# Patient Record
Sex: MALE | Race: WHITE
[De-identification: names, ages, dates, MRNs, and addresses within clinical notes are randomized per-mention and may not be internally consistent; named-entity substitution may affect disease eponyms.]

---

## 2019-12-24 ENCOUNTER — HOSPITAL ENCOUNTER (INPATIENT)
Dept: HOSPITAL 63 - ER | Age: 44
LOS: 1 days | Discharge: HOME | DRG: 897 | End: 2019-12-25
Attending: INTERNAL MEDICINE | Admitting: INTERNAL MEDICINE
Payer: MEDICARE

## 2019-12-24 VITALS — BODY MASS INDEX: 44.1 KG/M2 | HEIGHT: 71 IN | WEIGHT: 315 LBS

## 2019-12-24 VITALS — DIASTOLIC BLOOD PRESSURE: 82 MMHG | SYSTOLIC BLOOD PRESSURE: 127 MMHG

## 2019-12-24 DIAGNOSIS — K21.9: ICD-10-CM

## 2019-12-24 DIAGNOSIS — F31.9: ICD-10-CM

## 2019-12-24 DIAGNOSIS — Z91.5: ICD-10-CM
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
## 2019-12-24 DIAGNOSIS — R45.851: ICD-10-CM

## 2019-12-24 DIAGNOSIS — G47.33: ICD-10-CM

## 2019-12-24 DIAGNOSIS — F43.10: ICD-10-CM
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
## 2019-12-24 DIAGNOSIS — F41.9: ICD-10-CM
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
## 2019-12-24 DIAGNOSIS — E66.01: ICD-10-CM

## 2019-12-24 DIAGNOSIS — I25.10: ICD-10-CM

## 2019-12-24 DIAGNOSIS — I10: ICD-10-CM
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
## 2019-12-24 DIAGNOSIS — E11.9: ICD-10-CM

## 2019-12-24 DIAGNOSIS — F10.129: Primary | ICD-10-CM
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
## 2019-12-24 LAB
ALBUMIN SERPL-MCNC: 3.5 G/DL (ref 3.4–5)
ALBUMIN/GLOB SERPL: 0.8 {RATIO} (ref 1–1.7)
ALP SERPL-CCNC: 106 U/L (ref 46–116)
ALT SERPL-CCNC: 47 U/L (ref 16–63)
AMPHETAMINE/METHAMPHETAMINE: (no result)
ANION GAP SERPL CALC-SCNC: 7 MMOL/L (ref 6–14)
APTT PPP: (no result) S
AST SERPL-CCNC: 24 U/L (ref 15–37)
BACTERIA #/AREA URNS HPF: 0 /HPF
BARBITURATES UR-MCNC: (no result) UG/ML
BASOPHILS # BLD AUTO: 0 X10^3/UL (ref 0–0.2)
BASOPHILS NFR BLD: 0 % (ref 0–3)
BENZODIAZ UR-MCNC: (no result) UG/L
BILIRUB SERPL-MCNC: 0.3 MG/DL (ref 0.2–1)
BILIRUB UR QL STRIP: (no result)
BUN/CREAT SERPL: 12 (ref 6–20)
CA-I SERPL ISE-MCNC: 7 MG/DL (ref 8–26)
CALCIUM SERPL-MCNC: 8.8 MG/DL (ref 8.5–10.1)
CANNABINOIDS UR-MCNC: (no result) UG/L
CHLORIDE SERPL-SCNC: 105 MMOL/L (ref 98–107)
CO2 SERPL-SCNC: 30 MMOL/L (ref 21–32)
COCAINE UR-MCNC: (no result) NG/ML
CREAT SERPL-MCNC: 0.6 MG/DL (ref 0.7–1.3)
EOSINOPHIL NFR BLD: 0.2 X10^3/UL (ref 0–0.7)
EOSINOPHIL NFR BLD: 2 % (ref 0–3)
ERYTHROCYTE [DISTWIDTH] IN BLOOD BY AUTOMATED COUNT: 18.3 % (ref 11.5–14.5)
FIBRINOGEN PPP-MCNC: CLEAR MG/DL
GFR SERPLBLD BASED ON 1.73 SQ M-ARVRAT: 147.8 ML/MIN
GLOBULIN SER-MCNC: 4.5 G/DL (ref 2.2–3.8)
GLUCOSE SERPL-MCNC: 105 MG/DL (ref 70–99)
GLUCOSE UR STRIP-MCNC: (no result) MG/DL
HCT VFR BLD CALC: 38.1 % (ref 39–53)
HGB BLD-MCNC: 12.1 G/DL (ref 13–17.5)
LIPASE: 119 U/L (ref 73–393)
LYMPHOCYTES # BLD: 1.4 X10^3/UL (ref 1–4.8)
LYMPHOCYTES NFR BLD AUTO: 18 % (ref 24–48)
MCH RBC QN AUTO: 23 PG (ref 25–35)
MCHC RBC AUTO-ENTMCNC: 32 G/DL (ref 31–37)
MCV RBC AUTO: 73 FL (ref 79–100)
METHADONE SERPL-MCNC: (no result) NG/ML
MONO #: 0.3 X10^3/UL (ref 0–1.1)
MONOCYTES NFR BLD: 4 % (ref 0–9)
NEUT #: 6 X10^3UL (ref 1.8–7.7)
NEUTROPHILS NFR BLD AUTO: 76 % (ref 31–73)
NITRITE UR QL STRIP: (no result)
OPIATES UR-MCNC: (no result) NG/ML
PCP SERPL-MCNC: (no result) MG/DL
PLATELET # BLD AUTO: 295 X10^3/UL (ref 140–400)
POTASSIUM SERPL-SCNC: 3.8 MMOL/L (ref 3.5–5.1)
PROT SERPL-MCNC: 8 G/DL (ref 6.4–8.2)
RBC # BLD AUTO: 5.25 X10^6/UL (ref 4.3–5.7)
RBC #/AREA URNS HPF: 0 /HPF (ref 0–2)
SODIUM SERPL-SCNC: 142 MMOL/L (ref 136–145)
SP GR UR STRIP: 1.01
SQUAMOUS #/AREA URNS LPF: (no result) /LPF
UROBILINOGEN UR-MCNC: 0.2 MG/DL
WBC # BLD AUTO: 8 X10^3/UL (ref 4–11)
WBC #/AREA URNS HPF: 0 /HPF (ref 0–4)

## 2019-12-24 PROCEDURE — 96366 THER/PROPH/DIAG IV INF ADDON: CPT

## 2019-12-24 PROCEDURE — 70450 CT HEAD/BRAIN W/O DYE: CPT

## 2019-12-24 PROCEDURE — 36415 COLL VENOUS BLD VENIPUNCTURE: CPT

## 2019-12-24 PROCEDURE — 80053 COMPREHEN METABOLIC PANEL: CPT

## 2019-12-24 PROCEDURE — 83690 ASSAY OF LIPASE: CPT

## 2019-12-24 PROCEDURE — 96365 THER/PROPH/DIAG IV INF INIT: CPT

## 2019-12-24 PROCEDURE — 80307 DRUG TEST PRSMV CHEM ANLYZR: CPT

## 2019-12-24 PROCEDURE — 85025 COMPLETE CBC W/AUTO DIFF WBC: CPT

## 2019-12-24 PROCEDURE — 81001 URINALYSIS AUTO W/SCOPE: CPT

## 2019-12-24 NOTE — PHYS DOC
Past History


Past Medical History:  Anxiety, Depression, Diabetes, Other


Additional Past Medical Histor:  Suicidal ideation with multiple attempts last 

on 6 weeks ago, PTSD, CAD


Past Surgical History:  No Surgical History


Additional Smoking Information:  


Smoked meth 3 weeks ago


Alcohol Use:  Heavy


Additional Alcohol Information:  


1/2 1/5 of vodka today


Drug Use:  Methamphetamine





Adult General


Chief Complaint


Chief Complaint:  ALCOHOL INTOXICATION





HPI


HPI


44-year-old male presents via EMS with intoxication, fall and suicidal ideation.

The patient been drinking alcohol today. He's been having suicidal thoughts. 

Lately. He's been thinking specifically about buying bleach and drinking it. 

Today while he was drinking alcohol, he thinks he might a passed out. He is not 

exactly sure. He knows that he lost consciousness and woke up on the ground with

his head hurting. He tells me that he has PTSD and he said a lot of emotional 

stress. He just wants his life to be over. He knows that he needs to get some 

help as these feelings aren't going away. He denies any other injuries or 

symptoms.





Review of Systems


Review of Systems





Constitutional: Denies fever or chills []


Eyes: Denies change in visual acuity, redness, or eye pain []


HENT: Denies nasal congestion or sore throat. Fall, abrasion right forehead.[]


Respiratory: Denies cough or shortness of breath []


Cardiovascular: No additional information not addressed in HPI []


GI: Denies abdominal pain, nausea, vomiting, bloody stools or diarrhea []


: Denies dysuria or hematuria []


Musculoskeletal: Denies back pain or joint pain []


Integument: Denies rash or skin lesions []


Neurologic: LOC. Denies headache, focal weakness or sensory changes []


Endocrine: Denies polyuria or polydipsia []





All other systems were reviewed and found to be within normal limits, except as 

documented in this note.





Current Medications


Current Medications





Current Medications








 Medications


  (Trade)  Dose


 Ordered  Sig/Pradip  Start Time


 Stop Time Status Last Admin


Dose Admin


 


 Multivitamins/


 Minerals


  (Infuvite Adult)  10 ml  STK-MED ONCE  12/24/19 18:45


 12/24/19 18:45 DC  





 


 Multivitamins/


 Minerals 10 ml/


 Folic Acid 1 mg/


 Thiamine HCl 100


 mg/Sodium Chloride  1,011.1 ml


  @ 1,000 mls/


 hr  1X  ONCE  12/24/19 18:30


 12/24/19 19:30  12/24/19 18:50


1,000 MLS/HR


 


 Thiamine HCl


  (Thiamine Vial)  200 mg  STK-MED ONCE  12/24/19 18:45


 12/24/19 18:45 DC  














Allergies


Allergies





Allergies








Coded Allergies Type Severity Reaction Last Updated Verified


 


  No Known Drug Allergies    12/24/19 No











Physical Exam


Physical Exam





Constitutional: Well developed, morbidly obese, well nourished, no acute dist

ress, non-toxic appearance. []


HENT: Abrasion right forehead. Normocephalic,, bilateral external ears normal, 

oropharynx dry, no oral exudates, nose normal. []


Eyes: PERRLA, EOMI, conjunctiva normal, no discharge. [] 


Neck: Normal range of motion, no tenderness, supple, no stridor. [] 


Cardiovascular:Heart rate regular rhythm, no murmur []


Lungs & Thorax:  Bilateral breath sounds clear to auscultation []


Abdomen: Bowel sounds normal, soft, no tenderness, no masses, no pulsatile 

masses. [] 


Skin: Warm, dry, no erythema, no rash. [] 


Back: No tenderness, no CVA tenderness. [] 


Extremities: No tenderness, no cyanosis, no clubbing, ROM intact, no edema. [] 


Neurologic: Alert and oriented X 3, normal motor function, normal sensory 

function, no focal deficits noted. []


Psychologic: Affect intoxicated, mood suicidal[]





Current Patient Data


Vital Signs





                                   Vital Signs








  Date Time  Temp Pulse Resp B/P (MAP) Pulse Ox O2 Delivery O2 Flow Rate FiO2


 


12/24/19 18:22 98.4 89 18  93 Room Air  








Lab Results





                                Laboratory Tests








Test


 12/24/19


18:05


 


White Blood Count


 8.0 x10^3/uL


(4.0-11.0)


 


Red Blood Count


 5.25 x10^6/uL


(4.30-5.70)


 


Hemoglobin


 12.1 g/dL


(13.0-17.5)  L


 


Hematocrit


 38.1 %


(39.0-53.0)  L


 


Mean Corpuscular Volume


 73 fL ()


L


 


Mean Corpuscular Hemoglobin


 23 pg (25-35)


L


 


Mean Corpuscular Hemoglobin


Concent 32 g/dL


(31-37)


 


Red Cell Distribution Width


 18.3 %


(11.5-14.5)  H


 


Platelet Count


 295 x10^3/uL


(140-400)


 


Neutrophils (%) (Auto) 76 % (31-73)  H


 


Lymphocytes (%) (Auto) 18 % (24-48)  L


 


Monocytes (%) (Auto) 4 % (0-9)  


 


Eosinophils (%) (Auto) 2 % (0-3)  


 


Basophils (%) (Auto) 0 % (0-3)  


 


Neutrophils # (Auto)


 6.0 x10^3uL


(1.8-7.7)


 


Lymphocytes # (Auto)


 1.4 x10^3/uL


(1.0-4.8)


 


Monocytes # (Auto)


 0.3 x10^3/uL


(0.0-1.1)


 


Eosinophils # (Auto)


 0.2 x10^3/uL


(0.0-0.7)


 


Basophils # (Auto)


 0.0 x10^3/uL


(0.0-0.2)


 


Sodium Level


 142 mmol/L


(136-145)


 


Potassium Level


 3.8 mmol/L


(3.5-5.1)


 


Chloride Level


 105 mmol/L


()


 


Carbon Dioxide Level


 30 mmol/L


(21-32)


 


Anion Gap 7 (6-14)  


 


Blood Urea Nitrogen


 7 mg/dL (8-26)


L


 


Creatinine


 0.6 mg/dL


(0.7-1.3)  L


 


Estimated GFR


(Cockcroft-Gault) 147.8  





 


BUN/Creatinine Ratio 12 (6-20)  


 


Glucose Level


 105 mg/dL


(70-99)  H


 


Calcium Level


 8.8 mg/dL


(8.5-10.1)


 


Total Bilirubin


 0.3 mg/dL


(0.2-1.0)


 


Aspartate Amino Transferase


(AST) 24 U/L (15-37)





 


Alanine Aminotransferase (ALT)


 47 U/L (16-63)





 


Alkaline Phosphatase


 106 U/L


()


 


Total Protein


 8.0 g/dL


(6.4-8.2)


 


Albumin


 3.5 g/dL


(3.4-5.0)


 


Albumin/Globulin Ratio


 0.8 (1.0-1.7)


L


 


Lipase


 119 U/L


()


 


Ethyl Alcohol Level


 180 mg/dL


(0-10)  H











EKG


EKG


[]





Radiology/Procedures


Radiology/Procedures


[]


Impressions:


EXAM: CT Head without IV contrast


 


CLINICAL HISTORY: Fall, LOC, intoxicated. Right side forehead abrasion 


 


COMPARISON: None.


 


TECHNIQUE:


Routine CT of the head without contrast. Soft tissues and bone windows 


were reviewed.


 


PQRS compliance statement - One or more of the following individualized 


dose reduction techniques were utilized for this study:


1.  Automated exposure control


2.  Adjustment of the mA and/or kV according to patient size


3.  Use of iterative reconstruction technique


 


FINDINGS:  


There is no evidence of hemorrhage, mass or extra-axial fluid collection.


 


Grey-white differentiation is maintained with no evidence of edema. 


 


There is no mass effect or shift of the intracranial structures.


 


The ventricles, basilar cisterns and cortical sulci are normal in size and


configuration for the patients stated age.


 


The cerebellum and brainstem are unremarkable.  


 


The calvarium demonstrates no evidence of fracture or focal lesion.


 


There is normal aeration of the visualized paranasal sinuses and mastoid 


air cells.


 


The visualized portions of the orbits are normal.


 


IMPRESSION:


No evidence for acute intracranial process.


 


Electronically signed by: Jamie Stewart MD (12/24/2019 7:33 PM) [facility] [place]














DICTATED AND SIGNED BY:     JAMIE STEWART MD


DATE:     12/24/19 1933





CC: NANCY PANTOJA DO; PCP,NO ~





Course & Med Decision Making


Course & Med Decision Making


Pertinent Labs and Imaging studies reviewed. (See chart for details)


Patient's labs are remarkable for an alcohol of 180. His other labs are 

unremarkable. With this elevated alcohol level, the patient cannot be screened 

for his suicidal ideation. Head CT is negative for acute findings. I will admit 

the patient to the hospital for intoxication and suicidal ideation. I spoke with

 Dr. Banuelos and he has accepted the patient for admission.


[]





Dragon Disclaimer


Dragon Disclaimer


This electronic medical record was generated, in whole or in part, using a voice

 recognition dictation system.





Departure


Departure:


Impression:  


   Primary Impression:  


   Suicidal ideation


   Additional Impression:  


   Alcohol intoxication


Disposition:  09 ADMITTED AS INPATIENT


Admitting Physician:  Deonte Banuelos


Condition:  STABLE


Referrals:  


PCP,NO (PCP)





Problem Qualifiers








   Additional Impression:  


   Alcohol intoxication


   Complication of substance-induced condition:  uncomplicated  Qualified Codes:

     F10.920 - Alcohol use, unspecified with intoxication, uncomplicated








NANCY PANTOJA DO                 Dec 24, 2019 18:58

## 2019-12-24 NOTE — RAD
EXAM: CT Head without IV contrast

 

CLINICAL HISTORY: Fall, LOC, intoxicated. Right side forehead abrasion 

 

COMPARISON: None.

 

TECHNIQUE:

Routine CT of the head without contrast. Soft tissues and bone windows 

were reviewed.

 

RS compliance statement - One or more of the following individualized 

dose reduction techniques were utilized for this study:

1.  Automated exposure control

2.  Adjustment of the mA and/or kV according to patient size

3.  Use of iterative reconstruction technique

 

FINDINGS:  

There is no evidence of hemorrhage, mass or extra-axial fluid collection.

 

Grey-white differentiation is maintained with no evidence of edema. 

 

There is no mass effect or shift of the intracranial structures.

 

The ventricles, basilar cisterns and cortical sulci are normal in size and

configuration for the patients stated age.

 

The cerebellum and brainstem are unremarkable.  

 

The calvarium demonstrates no evidence of fracture or focal lesion.

 

There is normal aeration of the visualized paranasal sinuses and mastoid 

air cells.

 

The visualized portions of the orbits are normal.

 

IMPRESSION:

No evidence for acute intracranial process.

 

Electronically signed by: Jamie Stewart MD (12/24/2019 7:33 PM) [facility]

## 2019-12-25 VITALS — DIASTOLIC BLOOD PRESSURE: 84 MMHG | SYSTOLIC BLOOD PRESSURE: 122 MMHG

## 2019-12-25 VITALS — DIASTOLIC BLOOD PRESSURE: 76 MMHG | SYSTOLIC BLOOD PRESSURE: 116 MMHG

## 2019-12-25 NOTE — HP
ADMIT DATE:  2019



HISTORY OF PRESENT ILLNESS:  The patient is a 44-year-old  male

patient,  who served in [employer] from  to  and was discharged due to

the fact that he has bipolar disorder and who basically brought to the Emergency

Room by EMS with intoxication, fall and suicidal ideation.  He was drinking

alcohol heavily on the day of admission, has been having suicidal thoughts and

lately has been thinking specifically about buying bleach and drinking it and

today while he was drinking alcohol he thinks he might have passed out.  He is

not exactly sure he knows that he lost consciousness and woke up on the ground

with his head hurting.  He stated that he has posttraumatic stress disorder.  He

said a lot of emotional stress, he just wants his life to be over.  He knows

that he needs to get some help, this feeling is not going away.  He denies any

other injuries or symptoms.  He apparently has had attempted suicide by cutting

his forearm about 4 years ago and has had other similar attempts before.  In the

emergency room on arrival, he was intoxicated with suicidal ideation.  He was

extensively investigated and his lab work was essentially normal including his

CBC and comprehensive metabolic profile.  His urinalysis was unremarkable;

however, his toxic screen was positive for alcohol with a blood alcohol level of

180 mg/dL and therefore a decision was made with suicidal ideation and alcohol

intoxication.  The patient was so intoxicated that he could not be screened for

suicidal ideation.  Head CT scan was unremarkable and the patient was admitted

and was continued on 1:1 observation.



PAST MEDICAL HISTORY:  Significant for type 2 diabetes mellitus,

gastroesophageal reflux disease, morbid obesity, obstructive sleep apnea, on

CPAP as well as hypertension together with bipolar disorder.  



PAST SURGICAL HISTORY:  Significant for back surgery at L5 diskectomy and

appendectomy.



ALLERGIES:  He has no known drug allergies.



MEDICATIONS:  He is currently on lithium 950 mg at bedtime.  He is also on

trazodone 200 mg at bedtime and Seroquel 300 mg at bedtime as well as Benadryl

50 mg at bedtime.  He is unable to sleep.  He is on amlodipine and lisinopril

for hypertension as well as metformin.



FAMILY HISTORY:  He has 1 older sister and a younger stepbrother, according to

him both are very successful.  His mother is still alive.  His father  when

he was 5 years old.  His stepfather still alive.



SOCIAL HISTORY:  He is single, never , has no children.  Does not smoke. 

He was drinking alcohol heavily.  He has not drank for almost 2 weeks.  He also

has not had any methamphetamine for almost 4 weeks and yesterday he basically

drank heavily because of all the circumstances and the fact that his grandfather

 and he lost his car.  He is currently disabled , lives in

[facility].



PHYSICAL EXAMINATION:

GENERAL:  On arrival to the Emergency Room, he looked well and he was

well-developed, morbidly obese, well-nourished, in no acute respiratory

distress.  There was no pallor, jaundice, cyanosis or thyromegaly.  No jugular

venous distention.  No limb edema.

VITAL SIGNS:  Heart rate was 89, blood pressure was 127/82, temperature was

98.4, respiratory rate was 18 and oxygen saturation was 98% on room air.

HEAD, EYES, EARS, NOSE AND THROAT:  Showed normocephalic.  Did have abrasions on

the left forehead.

NECK:  Supple.

HEART:  Showed normal first and second heart sounds.  No gallop or murmur.

CHEST:  Clear to auscultation.  No crepitation or rhonchi.

ABDOMEN:  Distended, soft, nontender.

NEUROLOGIC:  He was intoxicated and in a suicidal mood; however, he was able to

move all his extremities without difficulty with no evidence of any focal

deficit.



LABORATORY DATA:  Work showed that his white cell count was 8000, hemoglobin 12,

hematocrit 38, MCV 73 and platelet count 295,000 with normal manual

differential.  His chemistry showed a serum sodium 142, potassium 3.8, chloride

105, bicarbonate 30, anion gap of 7, BUN 7, creatinine 0.6, estimated GFR was

147 mL per minute.  His glucose was 105, calcium was 8.8.  Total bilirubin, AST,

ALT, alkaline phosphatase were normal.  Total protein was 8, albumin was 3.5 and

lipase was 119.  His urinalysis showed the urine was straw colored, clear with a

pH of 7, specific gravity of 1.010.  The urine was negative for protein,

glucose, ketones, blood, nitrite, bilirubin and alkaline phosphatase.  Urine was

negative for leukocyte esterase, no rbc's, no wbc's, and no bacteria.  His urine

toxic screen showed that the urine was negative for opiates, methadone,

barbiturates, phencyclidine, methamphetamine, amphetamine, benzodiazepine,

cocaine, and cannabinoids.  His blood alcohol level was high at 180 mg/dL and a

CT scan of the head showed there is no evidence of hemorrhage, mass, or

extraaxial fluid collection.  Gray-white differentiation is maintained with no

evidence of edema.  There is no mass effect or shift of the intracranial

structures.  The ventricles, basilar cisterns and cortical sulci are normal in

size and configuration for the patient's stated age.  The cerebellum and

brainstem are unremarkable.  The calvarium demonstrates no evidence of fracture

or focal lesion.  There is normal aeration of the visualized paranasal sinuses

and mastoid air cells.  The visualized portion of the orbits are normal.



ASSESSMENT AND PLAN:  The patient was admitted with diagnosis of alcohol

intoxication and suicidal ideation.  He was given a banana bag and ondansetron

and was admitted for 1:1 observation.  Once the patient is awake, alert and

medically stable, he will be screened for his suicidal ideation.





______________________________

MARICRUZ ROSSI MD



DR:  GILBERT/livna  JOB#:  [id number] / [id number]

DD:  2019 12:25  DT:  2019 12:42